# Patient Record
Sex: MALE | Race: WHITE
[De-identification: names, ages, dates, MRNs, and addresses within clinical notes are randomized per-mention and may not be internally consistent; named-entity substitution may affect disease eponyms.]

---

## 2019-10-26 ENCOUNTER — HOSPITAL ENCOUNTER (INPATIENT)
Dept: HOSPITAL 54 - TELE-TD | Age: 54
LOS: 1 days | Discharge: HOME | DRG: 974 | End: 2019-10-27
Attending: NURSE PRACTITIONER | Admitting: NURSE PRACTITIONER
Payer: MEDICARE

## 2019-10-26 VITALS — SYSTOLIC BLOOD PRESSURE: 116 MMHG | DIASTOLIC BLOOD PRESSURE: 68 MMHG

## 2019-10-26 VITALS — WEIGHT: 168 LBS | HEIGHT: 71 IN | BODY MASS INDEX: 23.52 KG/M2

## 2019-10-26 VITALS — SYSTOLIC BLOOD PRESSURE: 124 MMHG | DIASTOLIC BLOOD PRESSURE: 74 MMHG

## 2019-10-26 VITALS — DIASTOLIC BLOOD PRESSURE: 65 MMHG | SYSTOLIC BLOOD PRESSURE: 102 MMHG

## 2019-10-26 VITALS — SYSTOLIC BLOOD PRESSURE: 107 MMHG | DIASTOLIC BLOOD PRESSURE: 67 MMHG

## 2019-10-26 VITALS — DIASTOLIC BLOOD PRESSURE: 68 MMHG | SYSTOLIC BLOOD PRESSURE: 115 MMHG

## 2019-10-26 VITALS — DIASTOLIC BLOOD PRESSURE: 67 MMHG | SYSTOLIC BLOOD PRESSURE: 116 MMHG

## 2019-10-26 DIAGNOSIS — Z79.899: ICD-10-CM

## 2019-10-26 DIAGNOSIS — E83.51: ICD-10-CM

## 2019-10-26 DIAGNOSIS — F22: ICD-10-CM

## 2019-10-26 DIAGNOSIS — E87.6: ICD-10-CM

## 2019-10-26 DIAGNOSIS — N17.9: ICD-10-CM

## 2019-10-26 DIAGNOSIS — E88.09: ICD-10-CM

## 2019-10-26 DIAGNOSIS — F43.10: ICD-10-CM

## 2019-10-26 DIAGNOSIS — Z98.890: ICD-10-CM

## 2019-10-26 DIAGNOSIS — F15.90: ICD-10-CM

## 2019-10-26 DIAGNOSIS — M62.82: ICD-10-CM

## 2019-10-26 DIAGNOSIS — N18.9: ICD-10-CM

## 2019-10-26 DIAGNOSIS — G92: ICD-10-CM

## 2019-10-26 DIAGNOSIS — A41.9: Primary | ICD-10-CM

## 2019-10-26 DIAGNOSIS — B20: ICD-10-CM

## 2019-10-26 DIAGNOSIS — E83.39: ICD-10-CM

## 2019-10-26 DIAGNOSIS — K62.3: ICD-10-CM

## 2019-10-26 DIAGNOSIS — F32.9: ICD-10-CM

## 2019-10-26 DIAGNOSIS — B25.9: ICD-10-CM

## 2019-10-26 DIAGNOSIS — D69.6: ICD-10-CM
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
## 2019-10-26 DIAGNOSIS — T50.905A: ICD-10-CM

## 2019-10-26 LAB
ALBUMIN SERPL BCP-MCNC: 3 G/DL (ref 3.4–5)
ALP SERPL-CCNC: 48 U/L (ref 46–116)
ALT SERPL W P-5'-P-CCNC: 69 U/L (ref 12–78)
AST SERPL W P-5'-P-CCNC: 124 U/L (ref 15–37)
BASOPHILS # BLD AUTO: 0 /CMM (ref 0–0.2)
BASOPHILS NFR BLD AUTO: 0.2 % (ref 0–2)
BILIRUB DIRECT SERPL-MCNC: 0.2 MG/DL (ref 0–0.2)
BILIRUB SERPL-MCNC: 1.4 MG/DL (ref 0.2–1)
BUN SERPL-MCNC: 21 MG/DL (ref 7–18)
CALCIUM SERPL-MCNC: 7.2 MG/DL (ref 8.5–10.1)
CHLORIDE SERPL-SCNC: 105 MMOL/L (ref 98–107)
CO2 SERPL-SCNC: 21 MMOL/L (ref 21–32)
CREAT SERPL-MCNC: 1.3 MG/DL (ref 0.6–1.3)
EOSINOPHIL NFR BLD AUTO: 0 % (ref 0–6)
GLUCOSE SERPL-MCNC: 111 MG/DL (ref 74–106)
HCT VFR BLD AUTO: 42 % (ref 39–51)
HGB BLD-MCNC: 13.8 G/DL (ref 13.5–17.5)
LYMPHOCYTES NFR BLD AUTO: 1.1 /CMM (ref 0.8–4.8)
LYMPHOCYTES NFR BLD AUTO: 8.2 % (ref 20–44)
MCHC RBC AUTO-ENTMCNC: 33 G/DL (ref 31–36)
MCV RBC AUTO: 95 FL (ref 80–96)
MONOCYTES NFR BLD AUTO: 0.7 /CMM (ref 0.1–1.3)
MONOCYTES NFR BLD AUTO: 5.1 % (ref 2–12)
NEUTROPHILS # BLD AUTO: 11.3 /CMM (ref 1.8–8.9)
NEUTROPHILS NFR BLD AUTO: 86.5 % (ref 43–81)
PLATELET # BLD AUTO: 142 /CMM (ref 150–450)
POTASSIUM SERPL-SCNC: 3.4 MMOL/L (ref 3.5–5.1)
PROT SERPL-MCNC: 6.2 G/DL (ref 6.4–8.2)
RBC # BLD AUTO: 4.4 MIL/UL (ref 4.5–6)
SODIUM SERPL-SCNC: 137 MMOL/L (ref 136–145)
TSH SERPL DL<=0.005 MIU/L-ACNC: 0.98 UIU/ML (ref 0.36–3.74)
WBC NRBC COR # BLD AUTO: 13.1 K/UL (ref 4.3–11)

## 2019-10-26 PROCEDURE — G0378 HOSPITAL OBSERVATION PER HR: HCPCS

## 2019-10-26 RX ADMIN — POTASSIUM CHLORIDE SCH MEQ: 1500 TABLET, EXTENDED RELEASE ORAL at 12:00

## 2019-10-26 RX ADMIN — POTASSIUM CHLORIDE SCH MEQ: 1500 TABLET, EXTENDED RELEASE ORAL at 12:55

## 2019-10-26 RX ADMIN — ACETAMINOPHEN PRN MG: 325 TABLET ORAL at 16:10

## 2019-10-26 RX ADMIN — DEXTROSE MONOHYDRATE SCH MLS/HR: 50 INJECTION, SOLUTION INTRAVENOUS at 21:00

## 2019-10-26 RX ADMIN — SODIUM CHLORIDE PRN MLS/HR: 9 INJECTION, SOLUTION INTRAVENOUS at 04:02

## 2019-10-26 RX ADMIN — SODIUM CHLORIDE PRN MLS/HR: 9 INJECTION, SOLUTION INTRAVENOUS at 23:38

## 2019-10-26 RX ADMIN — PIPERACILLIN SODIUM AND TAZOBACTAM SODIUM SCH MLS/HR: .375; 3 INJECTION, POWDER, LYOPHILIZED, FOR SOLUTION INTRAVENOUS at 21:06

## 2019-10-26 RX ADMIN — PANTOPRAZOLE SODIUM SCH MG: 40 TABLET, DELAYED RELEASE ORAL at 08:52

## 2019-10-26 RX ADMIN — SODIUM CHLORIDE PRN MLS/HR: 9 INJECTION, SOLUTION INTRAVENOUS at 12:00

## 2019-10-26 RX ADMIN — ACETAMINOPHEN PRN MG: 325 TABLET ORAL at 04:02

## 2019-10-26 RX ADMIN — POTASSIUM CHLORIDE SCH MEQ: 1500 TABLET, EXTENDED RELEASE ORAL at 10:57

## 2019-10-26 RX ADMIN — DEXTROSE MONOHYDRATE SCH MLS/HR: 50 INJECTION, SOLUTION INTRAVENOUS at 10:47

## 2019-10-26 RX ADMIN — PIPERACILLIN SODIUM AND TAZOBACTAM SODIUM SCH MLS/HR: .375; 3 INJECTION, POWDER, LYOPHILIZED, FOR SOLUTION INTRAVENOUS at 13:02

## 2019-10-26 RX ADMIN — LOPERAMIDE HYDROCHLORIDE PRN MG: 2 CAPSULE ORAL at 23:37

## 2019-10-26 NOTE — NUR
TD RN NOTE:



PT IN BED, ASLEEP BUT AROUSES EASILY TO VERBAL AND TACTILE STIMULI. NO ACUTE DISTRESS NOTED. 
DENIES PAIN AND DISCOMFORT AT THIS TIME. ON TELE MONITOR SINUS RHYTHM HR 98 BPM. RIGHT 
FOREARM #20 INTACT AND PATENT, IVF INFUSING WELL. KEPT CLEAN, DRY AND COMFORTABLE. SAFETY 
AND FALL PRECAUTIONS OBSERVED AND MAINTAINED. WILL ENDORSE TO DAY SHIFT RN FOR CONTINUITY OF 
CARE.

## 2019-10-26 NOTE — NUR
RN NOTE:



STOOL FOR C. DIFF WAS COLLECTED PER DR. POST AND WAS SENT TO LAB. STOOL WAS GREENISH IN 
COLOR AND SLIGHTLY LIQUID IN FORM. SOON, CHARGE NURSE AWARE OF IT.

## 2019-10-26 NOTE — NUR
MS RN NOTE:



RECEIVED PT ON BED ALERT AND ORIENTED X3, ABLE TO MAKE NEEDS KNOWN. NO APPARENT DISTRESS 
NOTED. NO COMPLAINTS OF PAIN OR DISCOMFORT AT THIS TIME. NO SIGNS/SYMPTOMS OF WITHDRAWAL 
NOTED. ON NASAL CANNULA, 2LPM SATURATING WELL. IV ON RIGHT FOREARM #20 AND #22 INTACT AND 
PATENT, IVF INFUSING WELL. KEPT CLEAN, DRY AND COMFORTABLE. SAFETY AND FALL PRECAUTIONS 
OBSERVED AND MAINTAINED. WILL CONTINUE TO MONITOR PT.

## 2019-10-26 NOTE — NUR
TD RN NOTE:



TEMP WENT UP .3. TYLENOL GIVEN AND COOLING MEASURES PROVIDED, RECHECKED TEMP IT WENT 
DOWN .3. WILL CONTINUE TO MONITOR PT.

## 2019-10-26 NOTE — NUR
TD RN ADMISSION NOTE:



PT ADMITTED FROM [place] VIA [facility] WITH ADMITTING DIAGNOSIS OF SEPSIS SECONDARY TO 
RHABDOMYOLYSIS. PT IS ALERT AND ORIENTED X4. ABLE TO MAKE NEEDS KNOWN. NO APPARENT DISTRESS 
NOTED AT THIS TIME. ON ROOM AIR, BREATHING EVEN AND UNLABORED WITH NORMAL RESPIRATIONS. 
SINUS TACHY ON TELE MONITOR  BPM. PT HAS IV ON RIGHT FOREARM #20 INTACT AND PATENT, NO 
SIGNS/SYMPTOMS OF INFILTRATION NOTED. PERTINENT ASSESSMENTS DONE, RASH NOTED ON BUTTOCKS, 
PICTURE TAKEN AND PLACED ON CHART. KEPT CLEAN, DRY AND COMFORTABLE. CALL LIGHT PLACED WITHIN 
REACH. SIDE RAILS UP X2. BED ALARM ON. BED LOCKED AND IN LOWEST POSITION. WILL CONTINUE TO 
MONITOR PT.

## 2019-10-26 NOTE — NUR
RN NOTE:



BEDSIDE REPORT WAS GIVEN TO PM SHIFT NURSE FOR CONTINUITY OF CARE. PATIENT ON CONTACT 
ISOLATION FOR POSSIBLE C. DIFF. AND CD4/CD8 RATIO WAS STILL PENDING AND LAB TEST WAS A SENT 
OUT.

## 2019-10-26 NOTE — NUR
MS RN NOTE:



PT HAD AN EPISODE OF SOFT WATERY STOOL X5. STOOL WAS ALREADY SENT FOR C. DIFF BY DAY SHIFT 
NURSE, RESULT STILL PENDING. DR. HUIZAR MADE AWARE AND HE ORDERED IMODIUM 2MG Q4HR PRN. ORDER 
CARRIED OUT AND DONE. WILL CONTINUE TO MONITOR PT.

## 2019-10-26 NOTE — NUR
RN NOTE:



RECEIVED PATIENT IN BED, ASLEEP BUT AROUSABLE WITH TACTILE STIMULI AND VERBAL CUES. 
RESPIRATION EVEN AND UNLABORED SATURATING 99% W/ O2 2L/MIN VIA NC. (R) FA IV SITE NOTED 
PATENT AND INTACT INFUSING NS @125ML/HR. HOB ELEVATED. PATIENT REFUSED TO EAT BREAKFAST. 
PATIENT WAS WARM TO TOUCH. COOLING MEASURES IMPLEMENTED. CALL LIGHT WITHIN REACH. NEEDS 
ANTICIPATED.

## 2019-10-27 VITALS — SYSTOLIC BLOOD PRESSURE: 115 MMHG | DIASTOLIC BLOOD PRESSURE: 59 MMHG

## 2019-10-27 VITALS — SYSTOLIC BLOOD PRESSURE: 104 MMHG | DIASTOLIC BLOOD PRESSURE: 61 MMHG

## 2019-10-27 LAB
*BASOS: 0 %
*COMMENTS: (no result)
*EOS: 0 %
*HCT: 38.6 % (ref 37.5–51)
*HGB: 13.3 G/DL (ref 13–17.7)
*IMMATURE GRANULOCYTES(ABS): 0 X10E3/UL (ref 0–0.1)
*IMMATURE GRANULOCYTES: 0 %
*LYMPHOCYTES: 10 %
*MCH: 31.7 PG (ref 26.6–33)
*MCHC: 34.5 G/DL (ref 31.5–35.7)
*MCV: 92 FL (ref 79–97)
*MONOCYTES: 6 %
*NEUTROPHILS: 84 %
*NRBC: (no result) %
*PLT: 147 X10E3/UL (ref 150–450)
*RBC: 4.2 X10E6/UL (ref 4.14–5.8)
*RDW: 14.4 % (ref 12.3–15.4)
APPEARANCE UR: (no result)
BASOPHILS # BLD AUTO: 0 /CMM (ref 0–0.2)
BASOPHILS NFR BLD AUTO: 0.4 % (ref 0–2)
BILIRUB UR QL STRIP: NEGATIVE
BUN SERPL-MCNC: 14 MG/DL (ref 7–18)
CALCIUM SERPL-MCNC: 7.6 MG/DL (ref 8.5–10.1)
CHLORIDE SERPL-SCNC: 107 MMOL/L (ref 98–107)
CO2 SERPL-SCNC: 18 MMOL/L (ref 21–32)
COLOR UR: YELLOW
CREAT SERPL-MCNC: 1.1 MG/DL (ref 0.6–1.3)
EOSINOPHIL NFR BLD AUTO: 1.4 % (ref 0–6)
GLUCOSE SERPL-MCNC: 83 MG/DL (ref 74–106)
GLUCOSE UR STRIP-MCNC: NEGATIVE MG/DL
HCT VFR BLD AUTO: 40 % (ref 39–51)
HGB BLD-MCNC: 13.1 G/DL (ref 13.5–17.5)
HGB UR QL STRIP: (no result) ERY/UL
KETONES UR STRIP-MCNC: NEGATIVE MG/DL
LEUKOCYTE ESTERASE UR QL STRIP: NEGATIVE
LYMPHOCYTES NFR BLD AUTO: 1.1 /CMM (ref 0.8–4.8)
LYMPHOCYTES NFR BLD AUTO: 12.9 % (ref 20–44)
MAGNESIUM SERPL-MCNC: 2 MG/DL (ref 1.8–2.4)
MCHC RBC AUTO-ENTMCNC: 33 G/DL (ref 31–36)
MCV RBC AUTO: 95 FL (ref 80–96)
MONOCYTES NFR BLD AUTO: 0.4 /CMM (ref 0.1–1.3)
MONOCYTES NFR BLD AUTO: 4.9 % (ref 2–12)
NEUTROPHILS # BLD AUTO: 6.6 /CMM (ref 1.8–8.9)
NEUTROPHILS NFR BLD AUTO: 80.4 % (ref 43–81)
NITRITE UR QL STRIP: NEGATIVE
PH UR STRIP: 6 [PH] (ref 5–8)
PHOSPHATE SERPL-MCNC: 2.3 MG/DL (ref 2.5–4.9)
PLATELET # BLD AUTO: 125 /CMM (ref 150–450)
POTASSIUM SERPL-SCNC: 3.1 MMOL/L (ref 3.5–5.1)
PROT UR QL STRIP: (no result) MG/DL
RBC # BLD AUTO: 4.17 MIL/UL (ref 4.5–6)
RBC #/AREA URNS HPF: (no result) /HPF (ref 0–2)
SODIUM SERPL-SCNC: 139 MMOL/L (ref 136–145)
UROBILINOGEN UR STRIP-MCNC: 0.2 EU/DL
WBC #/AREA URNS HPF: (no result) /HPF (ref 0–3)
WBC NRBC COR # BLD AUTO: 8.2 K/UL (ref 4.3–11)

## 2019-10-27 RX ADMIN — PANTOPRAZOLE SODIUM SCH MG: 40 TABLET, DELAYED RELEASE ORAL at 07:47

## 2019-10-27 RX ADMIN — DEXTROSE MONOHYDRATE SCH MLS/HR: 50 INJECTION, SOLUTION INTRAVENOUS at 09:57

## 2019-10-27 RX ADMIN — LOPERAMIDE HYDROCHLORIDE PRN MG: 2 CAPSULE ORAL at 04:26

## 2019-10-27 RX ADMIN — SODIUM CHLORIDE PRN MLS/HR: 9 INJECTION, SOLUTION INTRAVENOUS at 08:25

## 2019-10-27 RX ADMIN — PIPERACILLIN SODIUM AND TAZOBACTAM SODIUM SCH MLS/HR: .375; 3 INJECTION, POWDER, LYOPHILIZED, FOR SOLUTION INTRAVENOUS at 13:26

## 2019-10-27 RX ADMIN — POTASSIUM CHLORIDE SCH MEQ: 1500 TABLET, EXTENDED RELEASE ORAL at 09:57

## 2019-10-27 RX ADMIN — PIPERACILLIN SODIUM AND TAZOBACTAM SODIUM SCH MLS/HR: .375; 3 INJECTION, POWDER, LYOPHILIZED, FOR SOLUTION INTRAVENOUS at 05:50

## 2019-10-27 RX ADMIN — POTASSIUM CHLORIDE SCH MEQ: 1500 TABLET, EXTENDED RELEASE ORAL at 11:27

## 2019-10-27 RX ADMIN — ACETAMINOPHEN PRN MG: 325 TABLET ORAL at 04:26

## 2019-10-27 NOTE — NUR
MS RN OPENING NOTES

RECEIVED PT LYING ON BED,ALERT/ORIENTED X3.ON ROOM AIR,TOLERATING WELL.NO SOB AND ACUTE 
DISTRESS NOTED.CAN AMBULATE WITH ASSISTANCE.IV LINE IS ON RIGHT FA G20 WITH IV NS @125ML/HR 
IS RUNNING AND IN RIGHT FA G22,SL.SITE IS CLEAN,DRY AND INTACT.NO INFILTRATION NOTED.SAFETY 
IS MAINTAINED AT ALL TIMES.BED IS IN LOW POSITION AND LOCKED,CALL LIGHT IS WITHIN REACH.WILL 
CONTINUE TO MONITOR THE PT CLOSELY.

## 2019-10-27 NOTE — NUR
MS RN NOTE:



NO CHANGES NOTED THROUGHOUT THE SHIFT. NO APPARENT DISTRESS NOTED. DENIES PAIN AND 
DISCOMFORT AT THIS TIME. ON ROOM AIR, BREATHING EVEN AND UNLABORED WITH NORMAL RESPIRATIONS. 
PT HAD BOWEL MOVEMENT 6X, IMODIUM GIVEN AS ORDERED. KEPT CLEAN, DRY AND COMFORTABLE. SAFETY 
AND FALL PRECAUTIONS OBSERVED AND MAINTAINED. WILL ENDORSE TO DAY SHIFT RN FOR CONTINUITY OF 
CARE.

## 2019-10-27 NOTE — NUR
MS RN NOTE:



PER PATIENT REQUEST, DISCHARGED PATIENT AND BROUGHT TO LOBBY TO WAIT FOR FRIEND TO PICK HIM 
UP. VERBALLY STATED THAT HE IS OK TO WAIT IN THE LOBBY WITHOUT ANY NURSING STAFF. INFORMED 
SECURITY ON DUTY ABOUT DISCHARGED PATIENT AWAITING  FROM FRIEND. 

-------------------------------------------------------------------------------

Addendum: 10/27/19 at 1518 by DWAINE ELKINS RN

-------------------------------------------------------------------------------

ALL THE DISCHARGE MEDICATIONS AND INSTRUCTIONS GIVEN TO THE PT AND MEDICINE PRESCRIPTION 
HANDLED TO THE PT.IV LINE IS REMOVED,NO BLEEDING NOTED.ALL THE BELONGINGS TAKEN AND PAPER 
SIGNED BY THE PT.AMBULATED TO THE FRONT LOBBY BY SELF.ON ROOM AIR,TOLERATING WELL.NO 
COMPLICATIONS NOTED. 

-------------------------------------------------------------------------------

Addendum: 10/27/19 at 1519 by DWAINE ELKINS RN

-------------------------------------------------------------------------------

PT IS IN FRONT LOBBY AWAITING FOR HIS FRIEND TOMI FOR PICKING UP.[facility] [phone]

## 2019-10-28 LAB
*% CD 4 POS. LYMPH: 33.5 % (ref 30.8–58.5)
*% CD 8 POS. LYMPH: 32.3 % (ref 12–35.5)
*ABSOLUTE CD 4 HELPER: 402 /UL (ref 359–1519)
*ABSOLUTE CD 8 SUPPRESSOR: 388 /UL (ref 109–897)
*CD4/CD8 RATIO: 1.04 (ref 0.92–3.72)